# Patient Record
(demographics unavailable — no encounter records)

---

## 2024-10-22 NOTE — HISTORY OF PRESENT ILLNESS
[de-identified] : sore throat [FreeTextEntry6] : BIB father for sore throat x1 day. No fever. No difficulty breathing, cough, congestion. No v/d. No rash. No fatigue. Good po/uop/bm. Normal sleep and activity.

## 2024-10-22 NOTE — PHYSICAL EXAM
[Erythematous Oropharynx] : erythematous oropharynx [Enlarged Tonsils] : enlarged tonsils [Vesicles] : no vesicles [Exudate] : exudate [Ulcerative Lesions] : no ulcerative lesions [Palate petechiae] : palate petechiae [NL] : warm, clear [de-identified] : tonsils +2

## 2024-11-09 NOTE — HISTORY OF PRESENT ILLNESS
[de-identified] : sore throat [FreeTextEntry6] : BIB mother for sore throat x1 day.  Patient tx for strep on 10/22, mother reports patient finished amox on 11/1. Patient w/o c/o sore throat at that time. No fever. No difficulty breathing, cough, congestion. No v/d. No rash. No fatigue. Good po/uop/bm. Normal sleep and activity.

## 2024-11-09 NOTE — DISCUSSION/SUMMARY
[FreeTextEntry1] : Anticipatory guidance and parent education given.  Rapid strep test positive in office. Tcx sent for confirmation d/t recency of treatment  Take oral antibiotics as prescribed. Use Tylenol or Ibuprofen as needed.  After being on antibiotics for at least 24 hours patient less likely to spread infection. Follow up as needed for persistent or worsening symptoms.

## 2024-11-09 NOTE — PHYSICAL EXAM
[Erythematous Oropharynx] : erythematous oropharynx [Enlarged Tonsils] : enlarged tonsils [Vesicles] : no vesicles [Exudate] : no exudate [Ulcerative Lesions] : no ulcerative lesions [Palate petechiae] : palate petechiae [NL] : warm, clear [de-identified] : tonsils +2